# Patient Record
Sex: FEMALE | Race: WHITE | NOT HISPANIC OR LATINO | URBAN - METROPOLITAN AREA
[De-identification: names, ages, dates, MRNs, and addresses within clinical notes are randomized per-mention and may not be internally consistent; named-entity substitution may affect disease eponyms.]

---

## 2022-10-12 ENCOUNTER — OUTSIDE PLACE OF SERVICE (OUTPATIENT)
Dept: PULMONOLOGY | Facility: CLINIC | Age: 67
End: 2022-10-12
Payer: MEDICARE

## 2022-10-12 PROCEDURE — 99291 CRITICAL CARE FIRST HOUR: CPT | Mod: ,,, | Performed by: INTERNAL MEDICINE

## 2022-10-12 PROCEDURE — 99291 PR CRITICAL CARE, E/M 30-74 MINUTES: ICD-10-PCS | Mod: ,,, | Performed by: INTERNAL MEDICINE

## 2022-10-13 ENCOUNTER — OUTSIDE PLACE OF SERVICE (OUTPATIENT)
Dept: PULMONOLOGY | Facility: CLINIC | Age: 67
End: 2022-10-13
Payer: MEDICARE

## 2022-10-13 PROCEDURE — 99291 PR CRITICAL CARE, E/M 30-74 MINUTES: ICD-10-PCS | Mod: ,,, | Performed by: INTERNAL MEDICINE

## 2022-10-13 PROCEDURE — 99291 CRITICAL CARE FIRST HOUR: CPT | Mod: ,,, | Performed by: INTERNAL MEDICINE

## 2022-10-14 ENCOUNTER — OUTSIDE PLACE OF SERVICE (OUTPATIENT)
Dept: PULMONOLOGY | Facility: CLINIC | Age: 67
End: 2022-10-14
Payer: MEDICARE

## 2022-10-14 PROCEDURE — 99232 SBSQ HOSP IP/OBS MODERATE 35: CPT | Mod: FS,,, | Performed by: INTERNAL MEDICINE

## 2022-10-14 PROCEDURE — 99232 PR SUBSEQUENT HOSPITAL CARE,LEVL II: ICD-10-PCS | Mod: FS,,, | Performed by: INTERNAL MEDICINE

## 2022-11-21 ENCOUNTER — OUTSIDE PLACE OF SERVICE (OUTPATIENT)
Dept: GASTROENTEROLOGY | Facility: CLINIC | Age: 67
End: 2022-11-21
Payer: MEDICARE

## 2022-11-21 PROCEDURE — 99223 1ST HOSP IP/OBS HIGH 75: CPT | Mod: ,,, | Performed by: INTERNAL MEDICINE

## 2022-11-21 PROCEDURE — 99223 PR INITIAL HOSPITAL CARE,LEVL III: ICD-10-PCS | Mod: ,,, | Performed by: INTERNAL MEDICINE

## 2024-02-04 ENCOUNTER — HOSPITAL ENCOUNTER (OUTPATIENT)
Dept: TELEMEDICINE | Facility: HOSPITAL | Age: 69
Discharge: HOME | End: 2024-02-04
Payer: MEDICARE

## 2024-02-04 PROCEDURE — G0408 INPT/TELE FOLLOW UP 35: HCPCS | Mod: 95,G0,, | Performed by: STUDENT IN AN ORGANIZED HEALTH CARE EDUCATION/TRAINING PROGRAM

## 2024-02-04 NOTE — SUBJECTIVE & OBJECTIVE
HPI:  69 y.o. female with a history of HTN, diabetes, R temporo-occipital ICH 2/2 possible underlying CAA (Oct 2022) presented with agitation, confusion and left-sided weakness.  Family came home at around 4:00 p.m. and found her unresponsive.  In the ER, she was agitated and not moving her left side.    Unclear if she has any baseline deficits.    Recently underwent surgery on her right ankle (01/11/2024)    On Plavix 75 mg daily    /75       Images personally reviewed and interpreted:  Study: Done but not available for review (CT and CTA done)  Study Interpretation: N/A     Assessment and plan:    69/o who presents with confusion, agitation and left sided weakness. Has possible underlying CAA based on MRI read from Oct 2022 when she presented with a R temporo-occipital ICH.    Unable to review imaging from myself.  Please request Radiology to push images through ASAP.    If acute hemorrhage seen ->  -- Admit to Neuro ICU  -- Target BP<140/90  -- Hold all antithrombotics  -- Neurosurgery consult  Transfer to closest appropriate center if required.    If no hemorrhage seen, continue Plavix.     If CTA showed LVO, she may need to be transferred to the closest thrombectomy capable Center for angiogram and possibly EVT.  Please call [facility] if this is noted by Radiology on imaging.    Avoid DAPT due to possible underlying CAA and high risk of hemorrhage.    MRI brain      Lytics recommendation: Thrombolytic therapy not recommended due to Outside of treatment window   Thrombectomy recommendation: Awaiting CTA results from [facility] [place] [facility] for determination   Placement recommendation: admit to inpatient  transfer to nearest appropriate facility

## 2024-02-05 NOTE — CARE UPDATE
Reviewed CT at 5:57 pm    No acute hemorrhage or ischemia seen.  Large area of encephalomalacia involving the right parieto-occipito-temporal region noted.    Her mental status has improved since arrival to the ER although still agitated. Unclear if she has any baseline left sided deficits.    If she continues to improve, seizure w/ post-ictal state is a possibility. In that case, she would be at high risk for recurrent seizures.  Recommend Keppra load of 2g and start 500mg BID.  Check ammonia, urine toxicology, BAL, CBC and CMP  EEG if continues have events concerning for seizures.    Recommendations discussed with ED physician.            Damon Melchor MD  Vascular Neurology  [facility]